# Patient Record
Sex: FEMALE | Race: WHITE | NOT HISPANIC OR LATINO | Employment: FULL TIME | URBAN - METROPOLITAN AREA
[De-identification: names, ages, dates, MRNs, and addresses within clinical notes are randomized per-mention and may not be internally consistent; named-entity substitution may affect disease eponyms.]

---

## 2020-05-27 ENCOUNTER — PATIENT MESSAGE (OUTPATIENT)
Dept: OBSTETRICS AND GYNECOLOGY | Facility: CLINIC | Age: 45
End: 2020-05-27

## 2020-12-22 RX ORDER — DESOGESTREL AND ETHINYL ESTRADIOL AND ETHINYL ESTRADIOL 21-5 (28)
KIT ORAL
Qty: 28 TABLET | Refills: 5 | Status: SHIPPED | OUTPATIENT
Start: 2020-12-22 | End: 2021-05-17 | Stop reason: SDUPTHER

## 2020-12-22 NOTE — TELEPHONE ENCOUNTER
Notified pt her medication has been sent to pharmacy. Pt verbalized understanding.  Appt. susan'maggie in April 2021

## 2021-05-20 RX ORDER — DESOGESTREL AND ETHINYL ESTRADIOL 21-5 (28)
1 KIT ORAL DAILY
Qty: 28 TABLET | Refills: 0 | Status: SHIPPED | OUTPATIENT
Start: 2021-05-20 | End: 2021-05-27 | Stop reason: SDUPTHER

## 2021-05-27 ENCOUNTER — OFFICE VISIT (OUTPATIENT)
Dept: OBSTETRICS AND GYNECOLOGY | Facility: CLINIC | Age: 46
End: 2021-05-27
Payer: COMMERCIAL

## 2021-05-27 VITALS — DIASTOLIC BLOOD PRESSURE: 82 MMHG | WEIGHT: 157 LBS | SYSTOLIC BLOOD PRESSURE: 135 MMHG

## 2021-05-27 DIAGNOSIS — Z30.41 ENCOUNTER FOR SURVEILLANCE OF CONTRACEPTIVE PILLS: ICD-10-CM

## 2021-05-27 DIAGNOSIS — Z01.419 WELL WOMAN EXAM WITH ROUTINE GYNECOLOGICAL EXAM: Primary | ICD-10-CM

## 2021-05-27 DIAGNOSIS — Z12.31 SCREENING MAMMOGRAM, ENCOUNTER FOR: ICD-10-CM

## 2021-05-27 PROCEDURE — 99396 PR PREVENTIVE VISIT,EST,40-64: ICD-10-PCS | Mod: S$GLB,,, | Performed by: OBSTETRICS & GYNECOLOGY

## 2021-05-27 PROCEDURE — 99396 PREV VISIT EST AGE 40-64: CPT | Mod: S$GLB,,, | Performed by: OBSTETRICS & GYNECOLOGY

## 2021-05-27 RX ORDER — TRAZODONE HYDROCHLORIDE 100 MG/1
TABLET ORAL
COMMUNITY
Start: 2021-04-17

## 2021-05-27 RX ORDER — PLECANATIDE 3 MG/1
1 TABLET ORAL DAILY
COMMUNITY
Start: 2021-04-24 | End: 2022-08-16 | Stop reason: SDUPTHER

## 2021-05-27 RX ORDER — DESOGESTREL AND ETHINYL ESTRADIOL 21-5 (28)
1 KIT ORAL DAILY
Qty: 84 TABLET | Refills: 4 | Status: SHIPPED | OUTPATIENT
Start: 2021-05-27 | End: 2021-06-24

## 2021-05-27 RX ORDER — LEVOTHYROXINE SODIUM 88 UG/1
88 TABLET ORAL DAILY
COMMUNITY
Start: 2021-05-15

## 2021-05-27 RX ORDER — BUPROPION HYDROCHLORIDE 300 MG/1
300 TABLET ORAL DAILY
COMMUNITY
Start: 2021-05-15

## 2021-12-21 ENCOUNTER — IMMUNIZATION (OUTPATIENT)
Dept: PRIMARY CARE CLINIC | Facility: CLINIC | Age: 46
End: 2021-12-21
Payer: COMMERCIAL

## 2021-12-21 DIAGNOSIS — Z23 NEED FOR VACCINATION: Primary | ICD-10-CM

## 2022-08-16 ENCOUNTER — OFFICE VISIT (OUTPATIENT)
Dept: GASTROENTEROLOGY | Facility: CLINIC | Age: 47
End: 2022-08-16
Payer: COMMERCIAL

## 2022-08-16 VITALS
OXYGEN SATURATION: 98 % | HEIGHT: 59 IN | WEIGHT: 160.38 LBS | DIASTOLIC BLOOD PRESSURE: 72 MMHG | BODY MASS INDEX: 32.33 KG/M2 | HEART RATE: 95 BPM | SYSTOLIC BLOOD PRESSURE: 111 MMHG

## 2022-08-16 DIAGNOSIS — K21.9 GASTROESOPHAGEAL REFLUX DISEASE, UNSPECIFIED WHETHER ESOPHAGITIS PRESENT: ICD-10-CM

## 2022-08-16 DIAGNOSIS — Z87.11 HISTORY OF GASTRIC ULCER: ICD-10-CM

## 2022-08-16 DIAGNOSIS — K59.00 CONSTIPATION, UNSPECIFIED CONSTIPATION TYPE: ICD-10-CM

## 2022-08-16 DIAGNOSIS — R14.0 BLOATING: ICD-10-CM

## 2022-08-16 DIAGNOSIS — R10.816 EPIGASTRIC ABDOMINAL TENDERNESS WITHOUT REBOUND TENDERNESS: ICD-10-CM

## 2022-08-16 DIAGNOSIS — K58.1 IRRITABLE BOWEL SYNDROME WITH CONSTIPATION: Primary | ICD-10-CM

## 2022-08-16 PROCEDURE — 99213 PR OFFICE/OUTPT VISIT, EST, LEVL III, 20-29 MIN: ICD-10-PCS | Mod: S$GLB,,, | Performed by: INTERNAL MEDICINE

## 2022-08-16 PROCEDURE — 3074F PR MOST RECENT SYSTOLIC BLOOD PRESSURE < 130 MM HG: ICD-10-PCS | Mod: CPTII,S$GLB,, | Performed by: INTERNAL MEDICINE

## 2022-08-16 PROCEDURE — 3078F DIAST BP <80 MM HG: CPT | Mod: CPTII,S$GLB,, | Performed by: INTERNAL MEDICINE

## 2022-08-16 PROCEDURE — 1160F PR REVIEW ALL MEDS BY PRESCRIBER/CLIN PHARMACIST DOCUMENTED: ICD-10-PCS | Mod: CPTII,S$GLB,, | Performed by: INTERNAL MEDICINE

## 2022-08-16 PROCEDURE — 3008F PR BODY MASS INDEX (BMI) DOCUMENTED: ICD-10-PCS | Mod: CPTII,S$GLB,, | Performed by: INTERNAL MEDICINE

## 2022-08-16 PROCEDURE — 3074F SYST BP LT 130 MM HG: CPT | Mod: CPTII,S$GLB,, | Performed by: INTERNAL MEDICINE

## 2022-08-16 PROCEDURE — 1160F RVW MEDS BY RX/DR IN RCRD: CPT | Mod: CPTII,S$GLB,, | Performed by: INTERNAL MEDICINE

## 2022-08-16 PROCEDURE — 3008F BODY MASS INDEX DOCD: CPT | Mod: CPTII,S$GLB,, | Performed by: INTERNAL MEDICINE

## 2022-08-16 PROCEDURE — 3078F PR MOST RECENT DIASTOLIC BLOOD PRESSURE < 80 MM HG: ICD-10-PCS | Mod: CPTII,S$GLB,, | Performed by: INTERNAL MEDICINE

## 2022-08-16 PROCEDURE — 1159F MED LIST DOCD IN RCRD: CPT | Mod: CPTII,S$GLB,, | Performed by: INTERNAL MEDICINE

## 2022-08-16 PROCEDURE — 1159F PR MEDICATION LIST DOCUMENTED IN MEDICAL RECORD: ICD-10-PCS | Mod: CPTII,S$GLB,, | Performed by: INTERNAL MEDICINE

## 2022-08-16 PROCEDURE — 99213 OFFICE O/P EST LOW 20 MIN: CPT | Mod: S$GLB,,, | Performed by: INTERNAL MEDICINE

## 2022-08-16 RX ORDER — PLECANATIDE 3 MG/1
1 TABLET ORAL DAILY
Qty: 90 TABLET | Refills: 3 | Status: SHIPPED | OUTPATIENT
Start: 2022-08-16 | End: 2022-08-18

## 2022-08-16 RX ORDER — PANTOPRAZOLE SODIUM 40 MG/1
40 TABLET, DELAYED RELEASE ORAL DAILY
COMMUNITY
End: 2022-08-16 | Stop reason: SDUPTHER

## 2022-08-16 RX ORDER — PANTOPRAZOLE SODIUM 40 MG/1
40 TABLET, DELAYED RELEASE ORAL DAILY
Qty: 90 TABLET | Refills: 3 | Status: SHIPPED | OUTPATIENT
Start: 2022-08-16 | End: 2023-08-16 | Stop reason: SDUPTHER

## 2022-08-16 NOTE — LETTER
August 16, 2022        Jimbo Phillips MD  [address]   [facility]  [place] [address]             [place] [facility]  [address] [address]  Phone: [phone]  Fax: [phone]   Patient: Lorena WALLACE   MR Number: [id number]   YOB: 1975   Date of Visit: 8/16/2022       Dear Dr. Phillips:    Thank you for referring Lorena WALLACE to me for evaluation. Attached you will find relevant portions of my assessment and plan of care.    If you have questions, please do not hesitate to call me. I look forward to following Lorena WALLACE along with you.    Sincerely,      Nesha Christopher MD            CC  No Recipients    Enclosure

## 2022-08-16 NOTE — PATIENT INSTRUCTIONS
Schedule upper endoscopy.   Continue Trulance daily for constipation.  Continue pantoprazole 40 mg daily for reflux.     Please notify my office if you have not been contacted within two weeks after any procedures, submitting any samples (biopsies, blood, stool, urine, etc.) or after any imaging (X-ray, CT, MRI, etc.).

## 2022-08-16 NOTE — PROGRESS NOTES
Clinic Note    Reason for visit:  The primary encounter diagnosis was Irritable bowel syndrome with constipation. Diagnoses of History of gastric ulcer, Gastroesophageal reflux disease, unspecified whether esophagitis present, Constipation, unspecified constipation type, Bloating, and Epigastric abdominal tenderness without rebound tenderness were also pertinent to this visit.    PCP: Jimbo Phillips       HPI:  This is a 47 y.o. female who is being seen for a follow up visit. Patient with IBS-C, was on Trulance but has been out of medication for about a year. Trulance did work for her. Patient with GERD, on panto 40 daily which works for reflux. Has been out of medication for some time so she is currently having reflux. She reports for past 2 months, she has had constipation alternating with diarrhea, bloating, gas, and foul smelling stool. She reports yesterday having pale-colored stool and today she had a dark stool. Occasional BRBPR that she attributes to hemorrhoids.     EGD/Colonoscopy 6/17/2020 with [facility]: single superficial ulcer in fundus at site of previous surgery. GBx wnl w/o Hp. Colon nl. Repeat colon in 10 yr    Old chart:    Irritable bowel syndrome with constipation: controlled with Trulance, Squatty Potty helped       Gastric ulcer: in fundus 2020, thought to be related to suture/staple   6/17/2020      Gastroesophageal Reflux Disease (GERD): controlled mostly with panto 40 daily, failed 20 daily after 3 days       Screening of colonic neoplasia: average risk, due 2030     Review of Systems   Constitutional: Negative for chills, diaphoresis, fatigue, fever and unexpected weight change.   HENT: Negative for mouth sores, nosebleeds, postnasal drip, sore throat, trouble swallowing and voice change.    Eyes: Negative for pain, discharge and eye dryness.   Respiratory: Positive for apnea, cough and choking. Negative for chest tightness, shortness of breath and wheezing.    Cardiovascular: Negative for chest  pain, palpitations, leg swelling and claudication.   Gastrointestinal: Positive for abdominal distention, abdominal pain, anal bleeding, constipation, diarrhea, nausea, vomiting and reflux. Negative for blood in stool, change in bowel habit, rectal pain, fecal incontinence and change in bowel habit.   Genitourinary: Negative for bladder incontinence, difficulty urinating, dysuria, flank pain, frequency and hematuria.   Musculoskeletal: Negative for arthralgias, back pain, joint swelling and joint deformity.   Integumentary:  Negative for color change, rash and wound.   Allergic/Immunologic: Positive for food allergies. Negative for environmental allergies.   Neurological: Negative for seizures, facial asymmetry, speech difficulty, weakness, headaches and memory loss.   Hematological: Negative for adenopathy. Does not bruise/bleed easily.   Psychiatric/Behavioral: Negative for agitation, behavioral problems, confusion, hallucinations and sleep disturbance.      Past Medical History:   Diagnosis Date    Endometriosis     Fatty liver     Hepatitis     Hiatal hernia with GERD     Hypothyroidism     Irritable bowel syndrome with constipation     Obesity, Class I, BMI 30.0-34.9 (see actual BMI)     PCOS (polycystic ovarian syndrome)     Precancerous skin lesion     nose    Sleep apnea     no CPAP since recall     Past Surgical History:   Procedure Laterality Date    COLONOSCOPY  03/2020    FULGURATION OF ENDOMETRIOSIS      GASTRIC SLEEVE  2017    On[place]     Family History   Problem Relation Age of Onset    Diabetes Mother     Hypertension Mother     Heart failure Mother     Kidney cancer Father     Diabetes Father     Sleep apnea Brother     Brain cancer Brother 29        noted as tumor    Colon cancer Paternal Grandmother 60     Social History     Tobacco Use    Smoking status: Former Smoker     Quit date: 2012     Years since quitting: 10.6    Smokeless tobacco: Never Used   Substance Use Topics  "   Alcohol use: Yes     Comment: social    Drug use: Never     Review of patient's allergies indicates:  No Known Allergies     Medication List with Changes/Refills   Current Medications    BUPROPION (WELLBUTRIN XL) 300 MG 24 HR TABLET    Take 300 mg by mouth once daily.    LEVOTHYROXINE (SYNTHROID) 88 MCG TABLET    Take 88 mcg by mouth once daily.    TRAZODONE (DESYREL) 100 MG TABLET    TAKE 1/2 TO 1 TABLET BY MOUTH AT BEDTIME AS NEEDED    VIORELE, 28, 0.15-0.02 MGX21 /0.01 MG X 5 PER TABLET    TAKE 1 TABLET BY MOUTH EVERY DAY   Changed and/or Refilled Medications    Modified Medication Previous Medication    PANTOPRAZOLE (PROTONIX) 40 MG TABLET pantoprazole (PROTONIX) 40 MG tablet       Take 1 tablet (40 mg total) by mouth once daily.    Take 40 mg by mouth once daily.    TRULANCE 3 MG TAB TRULANCE 3 mg Tab       Take 1 tablet by mouth once daily.    Take 1 tablet by mouth once daily.         Vital Signs:  /72   Pulse 95   Ht 4' 11" (1.499 m)   Wt 72.8 kg (160 lb 6.4 oz)   SpO2 98%   BMI 32.40 kg/m²         Physical Exam  Vitals reviewed.   Constitutional:       General: She is awake. She is not in acute distress.     Appearance: Normal appearance. She is well-developed. She is obese. She is not ill-appearing, toxic-appearing or diaphoretic.   HENT:      Head: Normocephalic and atraumatic.      Nose: Nose normal.      Mouth/Throat:      Mouth: Mucous membranes are moist.      Pharynx: Oropharynx is clear. No oropharyngeal exudate or posterior oropharyngeal erythema.   Eyes:      General: Lids are normal. Gaze aligned appropriately. No scleral icterus.        Right eye: No discharge.         Left eye: No discharge.      Extraocular Movements: Extraocular movements intact.      Conjunctiva/sclera: Conjunctivae normal.   Neck:      Trachea: Trachea normal.   Cardiovascular:      Rate and Rhythm: Normal rate and regular rhythm.      Pulses:           Radial pulses are 2+ on the right side and 2+ on the " left side.   Pulmonary:      Effort: Pulmonary effort is normal. No respiratory distress.      Breath sounds: Normal breath sounds. No stridor. No wheezing or rhonchi.   Chest:      Chest wall: No tenderness.   Abdominal:      General: Bowel sounds are normal. There is no distension.      Palpations: Abdomen is soft. There is no fluid wave, hepatomegaly or mass.      Tenderness: There is abdominal tenderness in the epigastric area. There is no guarding or rebound.      Comments: Mild TTP   Musculoskeletal:         General: No tenderness or deformity.      Cervical back: Full passive range of motion without pain and neck supple. No tenderness.      Right lower leg: No edema.      Left lower leg: No edema.   Lymphadenopathy:      Cervical: No cervical adenopathy.   Skin:     General: Skin is warm and dry.      Capillary Refill: Capillary refill takes less than 2 seconds.      Coloration: Skin is not cyanotic, jaundiced or pale.      Findings: No rash.   Neurological:      General: No focal deficit present.      Mental Status: She is alert and oriented to person, place, and time.      Cranial Nerves: No facial asymmetry.      Motor: No tremor.   Psychiatric:         Attention and Perception: Attention normal.         Mood and Affect: Mood and affect normal.         Speech: Speech normal.         Behavior: Behavior normal. Behavior is cooperative.            All of the data above and below has been reviewed by myself and any further interpretations will be reflected in the assessment and plan.   The data includes review of external notes, and independent interpretation of lab results, procedures, x-rays, and imaging reports.      Assessment:  Irritable bowel syndrome with constipation    History of gastric ulcer  -     Ambulatory Referral to External Surgery    Gastroesophageal reflux disease, unspecified whether esophagitis present  -     pantoprazole (PROTONIX) 40 MG tablet; Take 1 tablet (40 mg total) by mouth once  daily.  Dispense: 90 tablet; Refill: 3  -     Ambulatory Referral to External Surgery    Constipation, unspecified constipation type  -     TRULANCE 3 mg Tab; Take 1 tablet by mouth once daily.  Dispense: 90 tablet; Refill: 3    Bloating    Epigastric abdominal tenderness without rebound tenderness  -     Ambulatory Referral to External Surgery    Will start back on Trulance daily to see if regulates BMs again. If still having changes in bowel habits, will consider repeat colonoscopy.   Plan for EGD with G/D biopsies to rule out Celiac and H. Pylori and to ensure gastric ulcer has healed.  was thought to be related to prior gastric sleeve.     Recommendations:  Schedule upper endoscopy.   Continue Trulance daily for constipation.  Continue pantoprazole 40 mg daily for reflux.     Risks, benefits, and alternatives of medical management, any associated procedures, and/or treatment discussed with the patient. Patient given opportunity to ask questions and voices understanding. Patient has elected to proceed with the recommended care modalities as discussed.    Follow up in about 1 year (around 8/16/2023).    Order summary:  Orders Placed This Encounter   Procedures    Ambulatory Referral to External Surgery        Instructed patient to notify my office if they have not been contacted within two weeks after any procedures, submitting any samples (biopsies, blood, stool, urine, etc.) or after any imaging (X-ray, CT, MRI, etc.).     Nesha Christopher MD    This document may have been created using a voice recognition transcribing system. Incorrect words or phrases may have been missed during proofreading. Please interpret accordingly or contact me for clarification.

## 2022-09-16 ENCOUNTER — TELEPHONE (OUTPATIENT)
Dept: GASTROENTEROLOGY | Facility: CLINIC | Age: 47
End: 2022-09-16
Payer: COMMERCIAL

## 2022-09-16 NOTE — TELEPHONE ENCOUNTER
----- Message from Nicole Ordoñez sent at 9/13/2022  3:02 PM CDT -----  pt states that she needs call back regarding status of TRULANCE 3 mg autho, also needs samples..[phone] (home)

## 2022-09-16 NOTE — TELEPHONE ENCOUNTER
----- Message from Nicole Ordoñez sent at 9/13/2022  3:02 PM CDT -----  pt states that she needs call back regarding status of TRULANCE 3 mg autho, also needs samples..[phone] (home)

## 2022-09-16 NOTE — TELEPHONE ENCOUNTER
On 09/15/2022 Spoke with patient regarding trying to get pre-authorization for med from insurance. Also spoke with Malka Hernandez NP about giving samples. Samples left up front to be picked up. Notified patient and verbalized understanding.     09/16/2022 Spoke with Swapna at [facility] pharmacy regarding Trulance stated not covered by patients insurance and not giving option of PA. I will call insurance today to see what can be done.    Called [employer] insurance and need to call patients prescription insurance for med authorization. Do not have that info in chart. Called patient to get information with no answer. Left message for patient to return my call with information of medication insurance information so I can follow-up on getting PA for trulance.

## 2022-09-19 NOTE — TELEPHONE ENCOUNTER
Spoke with patient this a.m. got proper information for prescription insurance. Called insurance company and prior authorization had already been sent per IVETTE Richards. PA under review will notify patient and MD when decision is made.

## 2022-10-10 ENCOUNTER — OUTSIDE PLACE OF SERVICE (OUTPATIENT)
Dept: GASTROENTEROLOGY | Facility: CLINIC | Age: 47
End: 2022-10-10

## 2022-10-10 PROCEDURE — 43239 PR EGD, FLEX, W/BIOPSY, SGL/MULTI: ICD-10-PCS | Mod: ,,, | Performed by: INTERNAL MEDICINE

## 2022-10-10 PROCEDURE — 43239 EGD BIOPSY SINGLE/MULTIPLE: CPT | Mod: ,,, | Performed by: INTERNAL MEDICINE

## 2022-10-16 ENCOUNTER — TELEPHONE (OUTPATIENT)
Dept: GASTROENTEROLOGY | Facility: CLINIC | Age: 47
End: 2022-10-16
Payer: COMMERCIAL

## 2022-10-17 NOTE — TELEPHONE ENCOUNTER
DBx nl, GBx react w/o Hp.   Notify patient no signs of Celiac disease or Hp on her small bowel and stomach Bx. No gastric ulcer. Her gastric sleeve surgery site looks well healed. Notify me if any issues. She should be back on her Trulance daily and panto 40 daily.  NBP

## 2023-02-26 NOTE — PROGRESS NOTES
Lorena WALLACE is a 47 y.o. female  who presents for a well woman exam.  She has had a rash at the crease of her abdomen for 2 weeks.     Past Medical History:   Diagnosis Date    Endometriosis     Fatty liver     Hepatitis     Hiatal hernia with GERD     Hypothyroidism     Irritable bowel syndrome with constipation     Obesity, Class I, BMI 30.0-34.9 (see actual BMI)     PCOS (polycystic ovarian syndrome)     Precancerous skin lesion     nose    Sleep apnea     no CPAP since recall       Past Surgical History:   Procedure Laterality Date    COLONOSCOPY  2020    FULGURATION OF ENDOMETRIOSIS      GASTRIC SLEEVE      [place]       OB History    Para Term  AB Living   1 0 0 0 1 0   SAB IAB Ectopic Multiple Live Births   1 0 0 0 0      # Outcome Date GA Lbr Herberth/2nd Weight Sex Delivery Anes PTL Lv   1 SAB                Family History   Problem Relation Age of Onset    Colon cancer Paternal Grandmother 60    Kidney cancer Father     Diabetes Father     Diabetes Mother     Hypertension Mother     Heart failure Mother     Sleep apnea Brother     Brain cancer Brother 29        noted as tumor    Cervical cancer Maternal Aunt        Social History     Tobacco Use    Smoking status: Former     Packs/day: 1.00     Years: 20.00     Pack years: 20.00     Types: Cigarettes     Quit date:      Years since quittin.1     Passive exposure: Past    Smokeless tobacco: Never   Substance Use Topics    Alcohol use: Not Currently     Comment: social    Drug use: Never         Current Outpatient Medications:     amitriptyline (ELAVIL) 10 MG tablet, TAKE 1 TABLET BY MOUTH 1 HOUR BEFORE BEDTIME, Disp: , Rfl:     buPROPion (WELLBUTRIN XL) 300 MG 24 hr tablet, Take 300 mg by mouth once daily., Disp: , Rfl:     levothyroxine (SYNTHROID) 88 MCG tablet, Take 88 mcg by mouth once daily., Disp: , Rfl:     pantoprazole (PROTONIX) 40 MG tablet, Take 1 tablet (40 mg total) by mouth once daily., Disp: 90 tablet, Rfl: 3     "traZODone (DESYREL) 100 MG tablet, TAKE 1/2 TO 1 TABLET BY MOUTH AT BEDTIME AS NEEDED, Disp: , Rfl:     VIORELE, 28, 0.15-0.02 mgx21 /0.01 mg x 5 per tablet, TAKE 1 TABLET BY MOUTH EVERY DAY, Disp: 28 tablet, Rfl: 7    clotrimazole-betamethasone 1-0.05% (LOTRISONE) cream, Apply to affected area 2 times daily, Disp: 15 g, Rfl: 1    fluconazole (DIFLUCAN) 100 MG tablet, Take 1 tablet (100 mg total) by mouth once daily. for 3 days, Disp: 3 tablet, Rfl: 0    TRULANCE 3 mg Tab, TAKE 1 TABLET BY MOUTH EVERY DAY (Patient not taking: Reported on 2/27/2023), Disp: 90 tablet, Rfl: 3     Review of patient's allergies indicates:  No Known Allergies     ROS:  GENERAL: Denies weight gain or weight loss. Feeling well overall.   SKIN: Denies rash or lesions.   HEAD: Denies head injury or headache.   NODES: Denies enlarged lymph nodes.   CHEST: Denies shortness of breath.   CARDIOVASCULAR: Denies palpitations or chest pain.   ABDOMEN: Denies abdominal pain, constipation, diarrhea, nausea, vomiting or rectal bleeding.   URINARY: Denies frequency, dysuria, hematuria, or burning on urination.  REPRODUCTIVE: See HPI.   BREASTS: Denies pain, lumps, or nipple discharge.   HEMATOLOGIC: Denies easy bruisability or excessive bleeding.  MUSCULOSKELETAL: Denies joint pain or swelling.   NEUROLOGIC: Denies syncope or weakness.   PSYCHIATRIC: Denies depression, anxiety or mood swings.    PHYSICAL EXAM:    /80 (BP Location: Left arm, Patient Position: Sitting, BP Method: Large (Manual))   Pulse 80   Ht 4' 11" (1.499 m)   Wt 75.2 kg (165 lb 12.8 oz)   LMP 02/01/2023 (Approximate) Comment: only bled for 1 day  BMI 33.49 kg/m²    Body mass index is 33.49 kg/m².     APPEARANCE: Well nourished, well developed, in no acute distress.  AFFECT: WNL, alert and oriented x 3  SKIN: No acne or hirsutism  NECK: Neck symmetric without masses or thyromegaly  NODES: No inguinal, cervical, axillary, or femoral lymph node enlargement  CHEST: Good " respiratory effect  ABDOMEN: Soft.  No tenderness or masses.  No hepatosplenomegaly.  No hernias. Erythematous papular rash at the crease.   BREASTS: Symmetrical, no skin changes or visible lesions.  No palpable masses, nipple discharge bilaterally.  PELVIC: Normal external genitalia without lesions.  Normal hair distribution.  Adequate perineal body, normal urethral meatus.  Urethra and bladder without tenderness or masses. Vagina moist and well rugated without lesions or discharge.  Cervix pink, without lesions, discharge or tenderness.  No significant cystocele or rectocele.  Bimanual exam shows uterus to be normal size, regular, mobile and nontender.  Adnexa without masses or tenderness.    EXTREMITIES: No edema.     Well woman exam with routine gynecological exam  -     Liquid-based pap smear, screening    Screening mammogram, encounter for  -     Mammo Digital Screening Bilat w/ [facility]; Future    Yeast dermatitis  -     clotrimazole-betamethasone 1-0.05% (LOTRISONE) cream; Apply to affected area 2 times daily  Dispense: 15 g; Refill: 1  -     fluconazole (DIFLUCAN) 100 MG tablet; Take 1 tablet (100 mg total) by mouth once daily. for 3 days  Dispense: 3 tablet; Refill: 0         Patient was counseled today on A.C.S. Pap guidelines and recommendations for yearly pelvic exams, mammograms and monthly self breast exams; to see her PCP for other health maintenance.     Follow up in 1 year

## 2023-02-27 ENCOUNTER — OFFICE VISIT (OUTPATIENT)
Dept: OBSTETRICS AND GYNECOLOGY | Facility: CLINIC | Age: 48
End: 2023-02-27
Payer: COMMERCIAL

## 2023-02-27 VITALS
HEIGHT: 59 IN | BODY MASS INDEX: 33.43 KG/M2 | HEART RATE: 80 BPM | DIASTOLIC BLOOD PRESSURE: 80 MMHG | WEIGHT: 165.81 LBS | SYSTOLIC BLOOD PRESSURE: 124 MMHG

## 2023-02-27 DIAGNOSIS — B37.2 YEAST DERMATITIS: ICD-10-CM

## 2023-02-27 DIAGNOSIS — Z12.31 SCREENING MAMMOGRAM, ENCOUNTER FOR: ICD-10-CM

## 2023-02-27 DIAGNOSIS — Z01.419 WELL WOMAN EXAM WITH ROUTINE GYNECOLOGICAL EXAM: Primary | ICD-10-CM

## 2023-02-27 PROCEDURE — 99396 PR PREVENTIVE VISIT,EST,40-64: ICD-10-PCS | Mod: S$GLB,,, | Performed by: OBSTETRICS & GYNECOLOGY

## 2023-02-27 PROCEDURE — 3079F DIAST BP 80-89 MM HG: CPT | Mod: CPTII,S$GLB,, | Performed by: OBSTETRICS & GYNECOLOGY

## 2023-02-27 PROCEDURE — 3074F PR MOST RECENT SYSTOLIC BLOOD PRESSURE < 130 MM HG: ICD-10-PCS | Mod: CPTII,S$GLB,, | Performed by: OBSTETRICS & GYNECOLOGY

## 2023-02-27 PROCEDURE — 3074F SYST BP LT 130 MM HG: CPT | Mod: CPTII,S$GLB,, | Performed by: OBSTETRICS & GYNECOLOGY

## 2023-02-27 PROCEDURE — 1159F MED LIST DOCD IN RCRD: CPT | Mod: CPTII,S$GLB,, | Performed by: OBSTETRICS & GYNECOLOGY

## 2023-02-27 PROCEDURE — 3079F PR MOST RECENT DIASTOLIC BLOOD PRESSURE 80-89 MM HG: ICD-10-PCS | Mod: CPTII,S$GLB,, | Performed by: OBSTETRICS & GYNECOLOGY

## 2023-02-27 PROCEDURE — 3008F PR BODY MASS INDEX (BMI) DOCUMENTED: ICD-10-PCS | Mod: CPTII,S$GLB,, | Performed by: OBSTETRICS & GYNECOLOGY

## 2023-02-27 PROCEDURE — 1159F PR MEDICATION LIST DOCUMENTED IN MEDICAL RECORD: ICD-10-PCS | Mod: CPTII,S$GLB,, | Performed by: OBSTETRICS & GYNECOLOGY

## 2023-02-27 PROCEDURE — 99396 PREV VISIT EST AGE 40-64: CPT | Mod: S$GLB,,, | Performed by: OBSTETRICS & GYNECOLOGY

## 2023-02-27 PROCEDURE — 3008F BODY MASS INDEX DOCD: CPT | Mod: CPTII,S$GLB,, | Performed by: OBSTETRICS & GYNECOLOGY

## 2023-02-27 RX ORDER — AMITRIPTYLINE HYDROCHLORIDE 10 MG/1
TABLET, FILM COATED ORAL
COMMUNITY
Start: 2023-01-30

## 2023-02-27 RX ORDER — CLOTRIMAZOLE AND BETAMETHASONE DIPROPIONATE 10; .64 MG/G; MG/G
CREAM TOPICAL
Qty: 15 G | Refills: 1 | Status: SHIPPED | OUTPATIENT
Start: 2023-02-27 | End: 2023-08-16

## 2023-02-27 RX ORDER — FLUCONAZOLE 100 MG/1
100 TABLET ORAL DAILY
Qty: 3 TABLET | Refills: 0 | Status: SHIPPED | OUTPATIENT
Start: 2023-02-27 | End: 2023-03-02

## 2023-03-02 LAB — Lab: NORMAL

## 2023-03-09 DIAGNOSIS — Z30.41 ENCOUNTER FOR SURVEILLANCE OF CONTRACEPTIVE PILLS: ICD-10-CM

## 2023-03-09 RX ORDER — DESOGESTREL AND ETHINYL ESTRADIOL AND ETHINYL ESTRADIOL 21-5 (28)
KIT ORAL
Qty: 84 TABLET | Refills: 4 | Status: SHIPPED | OUTPATIENT
Start: 2023-03-09 | End: 2023-08-16

## 2023-03-09 NOTE — TELEPHONE ENCOUNTER
Please notify pt we received a refill request from [facility] for [facility]. Does she need this refilled?

## 2023-05-30 ENCOUNTER — TELEPHONE (OUTPATIENT)
Dept: OBSTETRICS AND GYNECOLOGY | Facility: CLINIC | Age: 48
End: 2023-05-30
Payer: COMMERCIAL

## 2023-05-30 NOTE — TELEPHONE ENCOUNTER
----- Message from Jonna Jones sent at 5/30/2023  1:27 PM CDT -----  Contact: self  Type: Staff Message    Caller: Lorena WALLACE    Call Back Number: [phone] or [phone]    Nature of the Call: Patient is wanting to speak with nurse and will only talk to nurse concerning nature of this call. Please call back asap    Additional Information: n/a

## 2023-05-30 NOTE — TELEPHONE ENCOUNTER
Returned call to patient. Patient is requesting a refill of Diflucan. Advised patient to call her PCP as Dr. Levi is out of the office until Friday. Patient verbalized understanding.

## 2023-08-16 ENCOUNTER — OFFICE VISIT (OUTPATIENT)
Dept: GASTROENTEROLOGY | Facility: CLINIC | Age: 48
End: 2023-08-16
Payer: COMMERCIAL

## 2023-08-16 VITALS
DIASTOLIC BLOOD PRESSURE: 77 MMHG | BODY MASS INDEX: 34.2 KG/M2 | OXYGEN SATURATION: 98 % | WEIGHT: 169.63 LBS | SYSTOLIC BLOOD PRESSURE: 119 MMHG | HEART RATE: 98 BPM | HEIGHT: 59 IN

## 2023-08-16 DIAGNOSIS — R14.0 BLOATING: ICD-10-CM

## 2023-08-16 DIAGNOSIS — K58.1 IRRITABLE BOWEL SYNDROME WITH CONSTIPATION: Primary | ICD-10-CM

## 2023-08-16 DIAGNOSIS — Z87.11 HISTORY OF PEPTIC ULCER DISEASE: ICD-10-CM

## 2023-08-16 DIAGNOSIS — K21.9 GASTROESOPHAGEAL REFLUX DISEASE, UNSPECIFIED WHETHER ESOPHAGITIS PRESENT: ICD-10-CM

## 2023-08-16 PROCEDURE — 3008F BODY MASS INDEX DOCD: CPT | Mod: CPTII,S$GLB,, | Performed by: INTERNAL MEDICINE

## 2023-08-16 PROCEDURE — 99213 PR OFFICE/OUTPT VISIT, EST, LEVL III, 20-29 MIN: ICD-10-PCS | Mod: S$GLB,,, | Performed by: INTERNAL MEDICINE

## 2023-08-16 PROCEDURE — 1160F RVW MEDS BY RX/DR IN RCRD: CPT | Mod: CPTII,S$GLB,, | Performed by: INTERNAL MEDICINE

## 2023-08-16 PROCEDURE — 1160F PR REVIEW ALL MEDS BY PRESCRIBER/CLIN PHARMACIST DOCUMENTED: ICD-10-PCS | Mod: CPTII,S$GLB,, | Performed by: INTERNAL MEDICINE

## 2023-08-16 PROCEDURE — 99213 OFFICE O/P EST LOW 20 MIN: CPT | Mod: S$GLB,,, | Performed by: INTERNAL MEDICINE

## 2023-08-16 PROCEDURE — 3008F PR BODY MASS INDEX (BMI) DOCUMENTED: ICD-10-PCS | Mod: CPTII,S$GLB,, | Performed by: INTERNAL MEDICINE

## 2023-08-16 PROCEDURE — 1159F PR MEDICATION LIST DOCUMENTED IN MEDICAL RECORD: ICD-10-PCS | Mod: CPTII,S$GLB,, | Performed by: INTERNAL MEDICINE

## 2023-08-16 PROCEDURE — 3074F PR MOST RECENT SYSTOLIC BLOOD PRESSURE < 130 MM HG: ICD-10-PCS | Mod: CPTII,S$GLB,, | Performed by: INTERNAL MEDICINE

## 2023-08-16 PROCEDURE — 3078F DIAST BP <80 MM HG: CPT | Mod: CPTII,S$GLB,, | Performed by: INTERNAL MEDICINE

## 2023-08-16 PROCEDURE — 1159F MED LIST DOCD IN RCRD: CPT | Mod: CPTII,S$GLB,, | Performed by: INTERNAL MEDICINE

## 2023-08-16 PROCEDURE — 3074F SYST BP LT 130 MM HG: CPT | Mod: CPTII,S$GLB,, | Performed by: INTERNAL MEDICINE

## 2023-08-16 PROCEDURE — 3078F PR MOST RECENT DIASTOLIC BLOOD PRESSURE < 80 MM HG: ICD-10-PCS | Mod: CPTII,S$GLB,, | Performed by: INTERNAL MEDICINE

## 2023-08-16 RX ORDER — PLECANATIDE 3 MG/1
3 TABLET ORAL
COMMUNITY

## 2023-08-16 RX ORDER — PANTOPRAZOLE SODIUM 40 MG/1
40 TABLET, DELAYED RELEASE ORAL DAILY
Qty: 90 TABLET | Refills: 3 | Status: SHIPPED | OUTPATIENT
Start: 2023-08-16

## 2023-08-16 RX ORDER — FLUTICASONE PROPIONATE 50 MCG
2 SPRAY, SUSPENSION (ML) NASAL 2 TIMES DAILY
COMMUNITY
Start: 2023-06-21

## 2023-08-16 NOTE — PROGRESS NOTES
Clinic Note    Reason for visit:  The primary encounter diagnosis was Irritable bowel syndrome with constipation. Diagnoses of Gastroesophageal reflux disease, unspecified whether esophagitis present, Bloating, and History of peptic ulcer disease were also pertinent to this visit.    PCP: Jimbo Phillips.       HPI:  This is a 48 y.o. female who is here for a follow up. Patient with IBS-C, on Trulance 3 mg every 2-3 days and having BM daily. She was having episodes of fecal incontinence when she was taking daily. She uses squatty potty. She takes pantoprazole 40 mg daily for reflux which controls her reflux well. If she misses 1 day she will have severe reflux. Denies dysphagia, abdominal pain.     EGD 10/10/2022: Evidence of a previous gastric sleeve was seen in the stomach. Appears healthy and well healed with some proximal gastric lumen dilation. DBx nl, GBx react w/o Hp    EGD/Colonoscopy 6/17/2020 with [facility]: single superficial ulcer in fundus at site of previous surgery. GBx wnl w/o Hp. Colon nl. Repeat colon in 10 yr    Review of Systems   Constitutional:  Negative for fatigue, fever and unexpected weight change.   HENT:  Positive for postnasal drip. Negative for mouth sores, sore throat and trouble swallowing.    Eyes:  Positive for eye dryness. Negative for pain and discharge.   Respiratory:  Negative for apnea, cough, choking, chest tightness, shortness of breath and wheezing.    Cardiovascular:  Negative for chest pain, palpitations and leg swelling.   Gastrointestinal:  Negative for abdominal distention, abdominal pain, anal bleeding, blood in stool, change in bowel habit, constipation, diarrhea, nausea, rectal pain, vomiting, reflux, fecal incontinence and change in bowel habit.   Genitourinary:  Negative for bladder incontinence, dysuria and hematuria.   Musculoskeletal:  Negative for arthralgias, back pain and joint swelling.   Integumentary:  Negative for color change and rash.   Allergic/Immunologic:  Positive for environmental allergies and food allergies.   Neurological:  Negative for seizures and headaches.   Hematological:  Negative for adenopathy. Does not bruise/bleed easily.        Past Medical History:   Diagnosis Date    Endometriosis     Fatty liver     Hiatal hernia with GERD     Hypothyroidism     Irritable bowel syndrome with constipation     Obesity, Class I, BMI 30.0-34.9 (see actual BMI)     PCOS (polycystic ovarian syndrome)     Precancerous skin lesion     nose    Sleep apnea     no CPAP since recall     Past Surgical History:   Procedure Laterality Date    COLONOSCOPY  2020    FULGURATION OF ENDOMETRIOSIS      GASTRIC SLEEVE      Onel     Family History   Problem Relation Age of Onset    Colon cancer Paternal Grandmother 60    Kidney cancer Father     Diabetes Father     Diabetes Mother     Hypertension Mother     Heart failure Mother     Sleep apnea Brother     Brain cancer Brother 29        noted as tumor    Cervical cancer Maternal Aunt      Social History     Tobacco Use    Smoking status: Former     Current packs/day: 0.00     Average packs/day: 1 pack/day for 20.0 years (20.0 ttl pk-yrs)     Types: Cigarettes     Start date:      Quit date:      Years since quittin.6     Passive exposure: Past    Smokeless tobacco: Never   Substance Use Topics    Alcohol use: Not Currently     Comment: social    Drug use: Never     Review of patient's allergies indicates:  No Known Allergies     Medication List with Changes/Refills   Current Medications    AMITRIPTYLINE (ELAVIL) 10 MG TABLET    TAKE 1 TABLET BY MOUTH 1 HOUR BEFORE BEDTIME    BUPROPION (WELLBUTRIN XL) 300 MG 24 HR TABLET    Take 300 mg by mouth once daily.    FLUTICASONE PROPIONATE (FLONASE) 50 MCG/ACTUATION NASAL SPRAY    2 sprays by Each Nostril route 2 (two) times daily.    LEVOTHYROXINE (SYNTHROID) 88 MCG TABLET    Take 88 mcg by mouth once daily.    PLECANATIDE (TRULANCE) 3 MG TAB    Take 3 mg by mouth.     "TRAZODONE (DESYREL) 100 MG TABLET    TAKE 1/2 TO 1 TABLET BY MOUTH AT BEDTIME AS NEEDED   Changed and/or Refilled Medications    Modified Medication Previous Medication    PANTOPRAZOLE (PROTONIX) 40 MG TABLET pantoprazole (PROTONIX) 40 MG tablet       Take 1 tablet (40 mg total) by mouth once daily.    Take 1 tablet (40 mg total) by mouth once daily.   Discontinued Medications    CLOTRIMAZOLE-BETAMETHASONE 1-0.05% (LOTRISONE) CREAM    Apply to affected area 2 times daily    DESOG-E.ESTRADIOL/E.ESTRADIOL (VIORELE, 28,) 0.15-0.02 MGX21 /0.01 MG X 5 PER TABLET    TAKE 1 TABLET BY MOUTH EVERY DAY         Vital Signs:  /77   Pulse 98   Ht 4' 11" (1.499 m)   Wt 76.9 kg (169 lb 9.6 oz)   SpO2 98%   BMI 34.26 kg/m²         Physical Exam  Vitals reviewed.   Constitutional:       General: She is awake. She is not in acute distress.     Appearance: Normal appearance. She is well-developed. She is not ill-appearing, toxic-appearing or diaphoretic.   HENT:      Head: Normocephalic and atraumatic.      Nose: Nose normal.      Mouth/Throat:      Mouth: Mucous membranes are moist.      Pharynx: Oropharynx is clear. No oropharyngeal exudate or posterior oropharyngeal erythema.   Eyes:      General: Lids are normal. Gaze aligned appropriately. No scleral icterus.        Right eye: No discharge.         Left eye: No discharge.      Conjunctiva/sclera: Conjunctivae normal.   Neck:      Trachea: Trachea normal.   Cardiovascular:      Rate and Rhythm: Normal rate and regular rhythm.      Pulses:           Radial pulses are 2+ on the right side and 2+ on the left side.   Pulmonary:      Effort: Pulmonary effort is normal. No respiratory distress.      Breath sounds: No stridor. No wheezing.   Chest:      Chest wall: No tenderness.   Abdominal:      General: Bowel sounds are normal. There is no distension.      Palpations: Abdomen is soft. There is no fluid wave, hepatomegaly or mass.      Tenderness: There is no abdominal " tenderness. There is no guarding or rebound.   Musculoskeletal:         General: No tenderness or deformity.      Cervical back: Full passive range of motion without pain and neck supple. No tenderness.      Right lower leg: No edema.      Left lower leg: No edema.   Lymphadenopathy:      Cervical: No cervical adenopathy.   Skin:     General: Skin is warm and dry.      Capillary Refill: Capillary refill takes less than 2 seconds.      Coloration: Skin is not cyanotic, jaundiced or pale.   Neurological:      General: No focal deficit present.      Mental Status: She is alert and oriented to person, place, and time.      Motor: No tremor.   Psychiatric:         Attention and Perception: Attention normal.         Mood and Affect: Mood and affect normal.         Speech: Speech normal.         Behavior: Behavior normal. Behavior is cooperative.            All of the data above and below has been reviewed by myself and any further interpretations will be reflected in the assessment and plan.   The data includes review of external notes, and independent interpretation of lab results, procedures, x-rays, and imaging reports.      Assessment:  Irritable bowel syndrome with constipation    Gastroesophageal reflux disease, unspecified whether esophagitis present  -     pantoprazole (PROTONIX) 40 MG tablet; Take 1 tablet (40 mg total) by mouth once daily.  Dispense: 90 tablet; Refill: 3    Bloating    History of peptic ulcer disease    GERD- on panto 40mg daily. Symptomatic with decreased dosing  Constipation- with squatty potty daily and trulance as needed.     Recommendations:    Continue with pantoprazole 40 mg daily.  Continue with Trulance as needed.      Risks, benefits, and alternatives of medical management, any associated procedures, and/or treatment discussed with the patient. Patient given opportunity to ask questions and voices understanding. Patient has elected to proceed with the recommended care modalities as  discussed.    Instructed patient to notify my office if they have not been contacted within two weeks after any procedures, submitting any samples (biopsies, blood, stool, urine, etc.) or after any imaging (X-ray, CT, MRI, etc.).     Follow up in about 2 years (around 8/16/2025).    Order summary:  No orders of the defined types were placed in this encounter.     This assessment, plan, and documentation was performed in collaboration with Marcy Dumont NP.      This document may have been created using a voice recognition transcribing system. Incorrect words or phrases may have been missed during proofreading. Please interpret accordingly or contact me for clarification.     Nesha Christopher MD

## 2023-08-16 NOTE — PATIENT INSTRUCTIONS
Continue with pantoprazole 40mg daily.  Continue with Trulance as needed.    Please notify my office if you have not been contacted within two weeks after any procedures, submitting any samples (biopsies, blood, stool, urine, etc.) or after any imaging (X-ray, CT, MRI, etc.).

## 2023-09-12 ENCOUNTER — TELEPHONE (OUTPATIENT)
Dept: GASTROENTEROLOGY | Facility: CLINIC | Age: 48
End: 2023-09-12
Payer: COMMERCIAL

## 2023-09-12 DIAGNOSIS — R19.7 ACUTE DIARRHEA: Primary | ICD-10-CM

## 2023-09-12 NOTE — TELEPHONE ENCOUNTER
The patient reports diarrhea for the past 9 days.  Occasional vomiting.  She has not been taking her Trulance.  She had high fever previously but that seems to have resolved.  She went to urgent care twice.  Got fluids once due to decreased urination.  No stool test so far.  We will order.  She requests the orders are sent to [place] [facility] across from [facility].  JAQUELINE

## 2024-02-27 RX ORDER — ONDANSETRON 8 MG/1
TABLET, ORALLY DISINTEGRATING ORAL
COMMUNITY
Start: 2023-09-07

## 2024-02-27 RX ORDER — DICYCLOMINE HYDROCHLORIDE 10 MG/1
CAPSULE ORAL
COMMUNITY
Start: 2023-09-07

## 2024-02-28 NOTE — PROGRESS NOTES
CC: WELL WOMAN (age 45-59)-perimenopausal  Patient Care Team:  Jimbo Phillips MD as PCP - General (Internal Medicine)  Oswaldo Asencio MD (Gastroenterology)    HPI:  Patient is a 48 y.o. who presents for her well woman exam today.  History reviewed with patient.   Patient also has additional concerns she wants to discuss at this visit (see additional problem note below)    NEW PATIENT       CONTRACEPTION: ocps  HX ABNL PAPS: normal after cryo- age 18    REVIEW OF PRIOR DATA/ HEALTH MAINTENANCE:  LAST ANNUAL:   2023   LAST MMG-    LAST LABS- does with PCP   LAST COLONOSCOPY- - by Dr. Christopher - q 10 yrs (neg)     Past Medical History:   Diagnosis Date    Endometriosis     Fatty liver     Hiatal hernia with GERD     Hypothyroidism     Irritable bowel syndrome with constipation     Obesity, Class I, BMI 30.0-34.9 (see actual BMI)     PCOS (polycystic ovarian syndrome)     Precancerous skin lesion     nose    Sleep apnea     no CPAP since recall     SURGICAL HX:   has a past surgical history that includes GASTRIC SLEEVE (); Fulguration of endometriosis; and Colonoscopy (2020).    SOCIAL HX:    reports that she quit smoking about 12 years ago. Her smoking use included cigarettes. She started smoking about 32 years ago. She has a 20.0 pack-year smoking history. She has been exposed to tobacco smoke. She has never used smokeless tobacco. She reports that she does not currently use alcohol. She reports that she does not use drugs.    FAMILY HX:   family history includes Brain cancer (age of onset: 29) in her brother; Cervical cancer in her maternal aunt; Colon cancer (age of onset: 60) in her paternal grandmother; Diabetes in her father and mother; Heart failure in her mother; Hypertension in her mother; Kidney cancer in her father; Sleep apnea in her brother. .    ALLERGIES:  Patient has no known allergies.    Current Outpatient Medications on File Prior to Visit   Medication Sig  Dispense Refill Last Dose    amitriptyline (ELAVIL) 10 MG tablet TAKE 1 TABLET BY MOUTH 1 HOUR BEFORE BEDTIME   Taking    buPROPion (WELLBUTRIN XL) 300 MG 24 hr tablet Take 300 mg by mouth once daily.   Taking    fluticasone propionate (FLONASE) 50 mcg/actuation nasal spray 2 sprays by Each Nostril route 2 (two) times daily.   Taking    levothyroxine (SYNTHROID) 88 MCG tablet Take 88 mcg by mouth once daily.   Taking    pantoprazole (PROTONIX) 40 MG tablet Take 1 tablet (40 mg total) by mouth once daily. 90 tablet 3 Taking    simvastatin (ZOCOR) 10 MG tablet TAKE 1 TABLET BY MOUTH EVERY NIGHT TO CONTROL CHOLESTEROL   Taking    traZODone (DESYREL) 100 MG tablet TAKE 1/2 TO 1 TABLET BY MOUTH AT BEDTIME AS NEEDED   Taking    dicyclomine (BENTYL) 10 MG capsule TAKE 1 CAPSULE BY MOUTH THREE TIMES A DAY AS NEEDED FOR PAIN X5 DAYS   Not Taking    ondansetron (ZOFRAN-ODT) 8 MG TbDL TAKE 1 TABLET BY MOUTH EVERY 8 HOURS AS NEEDED FOR NAUSEA FOR 5 DAYS   Not Taking    plecanatide (TRULANCE) 3 mg Tab Take 3 mg by mouth.   Not Taking     ROS:  CONST:  No fever, chills, fatigue or unexpected changes in weight   CV: No chest pain or palpitations   RESP:  No shortness of breath or cough   GI: No abd pain, vomiting, diarrhea, blood in stool, or changes in bowel mvmts   SKIN: No rashes or lesions  MUSCULOSKELETAL: No joint swelling or pain   PSYCH: No changes in mood or insomia   BREASTS: No asymmetry, lumps, pain, nipple discharge, or skin changes   :  No dysuria, urgency, frequency, hematuria or incontinence           No vag dc, itching, odor or dryness           No pelvic pain, dyspareunia, or abnormal vaginal bleeding     VITALS:  Blood pressure 90/66, weight 71.8 kg (158 lb 3.2 oz), last menstrual period 02/24/2024.  Body mass index is 31.95 kg/m².     PHYSICAL EXAM-  APPEARANCE: Well appearing, in no acute distress.   NECK: Neck symmetric.   CV:  Normal rate   PULM: Normal resp rate, no resp distress, normal resp effort   "  PSYCH: Normal mood and affect, cooperative   SKIN: No rashes, lesions, or abnormal bruising   LYMPH: No inguinal or axillary adenopathy   ABD: Soft, without tenderness or masses.   BREAST: Symmetrical, no nipple changes, no skin changes, No palpable masses   PELVIC:  VULVA: No lesions. Normal female genitalia.  URETHRAL MEATUS: No masses, no significant prolapse.   BLADDER/ URETHRA: No masses or suprapubic tenderness   VAGINA: No lesions or erythema, No discharge.   CVX: No lesions,no cervical motion tenderness.   UTERUS: Normal size/shape, mobile, non-tender   ADNEXA: No masses, tenderness, or fullness.   ANUS/ PERINEUM: Normal tone.  No lesions.     *female chaperone present for entire exam    ASSESSMENT and PLAN:  Breast cancer screening by mammogram  -     Mammo Digital Screening Bilat w/ Bentley; Future; Expected date: 03/12/2024    Encounter for well woman exam with routine gynecological exam  -     Liquid-based pap smear, screenin     FOLLOWUP:  1 year for wwe or sooner prn    COUNSELING:  Patient was counseled today on recommendation for yearly pelvic exam, current Pap guidelines, self breast exams, contraception, recommendations for annual screening mammograms, and routine screening colonoscopy at age 44.  Encouraged patient to see her PCP for other health maintenance.    PROBLEM VISIT:  Problem HPI/ROS:            Pt Has been off ocps for about a year and cycles still regular but sometimes vary in length b/w 2-4 days and not heavy. In last 10 months, patient has noticed several things happen in the 3-4 days prior to her cycle that are recurrent and never happen any other time. Gets several areas on her skin where she gets itching with no rash (umbilicus, vulva) and then gets red and itchy under her ring those few days. Also has the regular pms and difficulty sleep for a couple nights that she always has had but the "inflammation" type issues are new. Also went to ER last week after waking up from sleep with " cp sob and went to er. Had workup for afib and had imaging and nothing found and they told it must be a panic attack. Also had just her right arm/hand shaking but that resolved with the iv valium and none of these symptoms have returned since. Pt concerned it is possibly hormonal and discussed labs and options including slynd continuously to avoid the hormone fluctuations     Problem PHYS EXAM:  (pertinent findings noted in PHYS EXAM above)     Problem ASSESMENT and PLAN:    Anxiety  -     Follicle Stimulating Hormone; Future  -     Luteinizing Hormone; Future  -     Testosterone; Future  -     TSH; Future  -     T4, Free; Future    Rash/skin eruption  -     DARRICK by IFA, w/Rflx; Future  -     Rheumatoid factor panel; Future; Expected date: 02/29/2024  -     Sedimentation rate; Future; Expected date: 02/29/2024    Signed rr and will fax to sotomayor to check labs he has done    *Total time spent: 40 min. 50 % or more was spent on counseling about diagnosis, treatment options, and coordination of care.

## 2024-02-29 ENCOUNTER — OFFICE VISIT (OUTPATIENT)
Dept: OBSTETRICS AND GYNECOLOGY | Facility: CLINIC | Age: 49
End: 2024-02-29
Payer: COMMERCIAL

## 2024-02-29 VITALS — SYSTOLIC BLOOD PRESSURE: 90 MMHG | DIASTOLIC BLOOD PRESSURE: 66 MMHG | BODY MASS INDEX: 31.95 KG/M2 | WEIGHT: 158.19 LBS

## 2024-02-29 DIAGNOSIS — F41.9 ANXIETY: ICD-10-CM

## 2024-02-29 DIAGNOSIS — R00.2 PALPITATIONS: ICD-10-CM

## 2024-02-29 DIAGNOSIS — Z01.419 ENCOUNTER FOR WELL WOMAN EXAM WITH ROUTINE GYNECOLOGICAL EXAM: ICD-10-CM

## 2024-02-29 DIAGNOSIS — Z12.31 BREAST CANCER SCREENING BY MAMMOGRAM: Primary | ICD-10-CM

## 2024-02-29 DIAGNOSIS — R21 RASH/SKIN ERUPTION: ICD-10-CM

## 2024-02-29 PROCEDURE — 99459 PELVIC EXAMINATION: CPT | Mod: S$GLB,,, | Performed by: OBSTETRICS & GYNECOLOGY

## 2024-02-29 PROCEDURE — 3074F SYST BP LT 130 MM HG: CPT | Mod: CPTII,S$GLB,, | Performed by: OBSTETRICS & GYNECOLOGY

## 2024-02-29 PROCEDURE — 99396 PREV VISIT EST AGE 40-64: CPT | Mod: S$GLB,,, | Performed by: OBSTETRICS & GYNECOLOGY

## 2024-02-29 PROCEDURE — 3078F DIAST BP <80 MM HG: CPT | Mod: CPTII,S$GLB,, | Performed by: OBSTETRICS & GYNECOLOGY

## 2024-02-29 PROCEDURE — 1159F MED LIST DOCD IN RCRD: CPT | Mod: CPTII,S$GLB,, | Performed by: OBSTETRICS & GYNECOLOGY

## 2024-02-29 PROCEDURE — 3008F BODY MASS INDEX DOCD: CPT | Mod: CPTII,S$GLB,, | Performed by: OBSTETRICS & GYNECOLOGY

## 2024-02-29 PROCEDURE — 99213 OFFICE O/P EST LOW 20 MIN: CPT | Mod: 25,S$GLB,, | Performed by: OBSTETRICS & GYNECOLOGY

## 2024-02-29 RX ORDER — SIMVASTATIN 10 MG/1
TABLET, FILM COATED ORAL
COMMUNITY
Start: 2024-02-09

## 2024-03-06 ENCOUNTER — PATIENT MESSAGE (OUTPATIENT)
Dept: OBSTETRICS AND GYNECOLOGY | Facility: CLINIC | Age: 49
End: 2024-03-06
Payer: COMMERCIAL

## 2024-03-06 LAB — Lab: NORMAL

## 2024-04-23 DIAGNOSIS — Z30.9 ENCOUNTER FOR CONTRACEPTIVE MANAGEMENT, UNSPECIFIED TYPE: Primary | ICD-10-CM

## 2024-04-23 RX ORDER — DROSPIRENONE 4 MG/1
4 TABLET, FILM COATED ORAL
COMMUNITY
End: 2024-04-23 | Stop reason: SDUPTHER

## 2024-04-23 RX ORDER — DROSPIRENONE 4 MG/1
4 TABLET, FILM COATED ORAL DAILY
Qty: 84 TABLET | Refills: 4 | Status: SHIPPED | OUTPATIENT
Start: 2024-04-23

## 2024-04-23 NOTE — TELEPHONE ENCOUNTER
Patient is calling in regards to meds. Reports the sample birth control went well, and is wanting a full size sent in.

## 2024-04-23 NOTE — TELEPHONE ENCOUNTER
----- Message from Robina Washington sent at 4/23/2024  8:46 AM CDT -----  Contact: Lorena  Patient is calling in regards to meds. Reports the sample birth control went well, and is wanting a full size sent in. Please return call to pt at  [phone] (work)         [id number] - [place] - [address]  [address] [address]  Phone: [phone] Fax: [phone]

## 2024-04-23 NOTE — TELEPHONE ENCOUNTER
Returned call to patient. Informed patient that Dr. Levi would send out her prescription of Slynd.

## 2024-05-01 ENCOUNTER — TELEPHONE (OUTPATIENT)
Dept: OBSTETRICS AND GYNECOLOGY | Facility: CLINIC | Age: 49
End: 2024-05-01
Payer: COMMERCIAL

## 2024-05-01 NOTE — TELEPHONE ENCOUNTER
----- Message from Jonna Jones sent at 5/1/2024 11:42 AM CDT -----  Contact: self  Type: Staff Message    Caller: Lorena WALLACE  Call Back Number: [phone]  Nature of the Call: pt wanting labs to be sent to [address]   Additional Information: na

## 2024-05-08 LAB
ANA SER-ACNC: NEGATIVE
FSH: 7.55 MIU/ML
LH: 8.52 MIU/ML
RHEUMATOID ARTHRITIS FACTOR: NEGATIVE IU/ML
RHEUMATOID ARTHRITIS, QN/FLUID: <10 IU/ML
SED RATE (WESTERGREN): 12 MM/HR (ref 0–20)
T4, FREE: 1.48 NG/DL (ref 0.93–1.7)
TESTOST SERPL-MCNC: 3.71 NG/DL (ref 8.4–48.1)
TSH SERPL DL<=0.005 MIU/L-ACNC: 0.35 UIU/ML (ref 0.27–4.2)

## 2024-05-08 NOTE — PROGRESS NOTES
Please let pt know that all of her labwork is normal. If she sees the testosterone level being a little low, that is ok as she is on ocps and does normally lower the testosterone so that is not abnormal

## 2024-05-09 ENCOUNTER — PATIENT MESSAGE (OUTPATIENT)
Dept: OBSTETRICS AND GYNECOLOGY | Facility: CLINIC | Age: 49
End: 2024-05-09
Payer: COMMERCIAL

## 2024-07-30 ENCOUNTER — DOCUMENTATION ONLY (OUTPATIENT)
Dept: OBSTETRICS AND GYNECOLOGY | Facility: CLINIC | Age: 49
End: 2024-07-30
Payer: COMMERCIAL

## 2024-08-30 DIAGNOSIS — K21.9 GASTROESOPHAGEAL REFLUX DISEASE, UNSPECIFIED WHETHER ESOPHAGITIS PRESENT: ICD-10-CM

## 2024-09-05 RX ORDER — PANTOPRAZOLE SODIUM 40 MG/1
40 TABLET, DELAYED RELEASE ORAL
Qty: 90 TABLET | Refills: 3 | Status: SHIPPED | OUTPATIENT
Start: 2024-09-05

## 2025-02-03 DIAGNOSIS — Z30.9 ENCOUNTER FOR CONTRACEPTIVE MANAGEMENT, UNSPECIFIED TYPE: ICD-10-CM

## 2025-02-03 RX ORDER — NORETHINDRONE 0.35 MG/1
1 TABLET ORAL DAILY
Qty: 90 TABLET | Refills: 2 | Status: SHIPPED | OUTPATIENT
Start: 2025-02-03

## 2025-02-03 NOTE — TELEPHONE ENCOUNTER
Please see the attached refill request. Patient has her annual in March. We sent out Slynd att her last visit. They are wanting to change this.

## 2025-03-04 RX ORDER — PERFLUOROHEXYLOCTANE 1 MG/MG
SOLUTION OPHTHALMIC
COMMUNITY
Start: 2025-01-02

## 2025-03-04 RX ORDER — SEMAGLUTIDE 0.68 MG/ML
0.25 INJECTION, SOLUTION SUBCUTANEOUS WEEKLY
COMMUNITY
Start: 2025-02-21

## 2025-03-04 RX ORDER — MONTELUKAST SODIUM 10 MG/1
10 TABLET ORAL
COMMUNITY
Start: 2025-01-31

## 2025-03-04 RX ORDER — CYCLOBENZAPRINE HCL 10 MG
10 TABLET ORAL 3 TIMES DAILY PRN
COMMUNITY
Start: 2025-01-27

## 2025-03-04 RX ORDER — DROSPIRENONE 4 MG/1
1 TABLET, FILM COATED ORAL DAILY
COMMUNITY
Start: 2025-01-05 | End: 2025-03-06 | Stop reason: SDUPTHER

## 2025-03-04 NOTE — PROGRESS NOTES
CC: WELL WOMAN (age 45-59)-perimenopausal  Patient Care Team:  Jimbo Phillips MD as PCP - General (Internal Medicine)  Oswaldo Asencio MD (Gastroenterology)        HPI:  Patient is a 49 y.o. who presents for her well woman exam today.  History reviewed with patient.   Patient is without complaints or concerns today. Has some irregular btb some months randomly but nothing heavy    CONTRACEPTION: prog only ocps  HX ABNL PAPS: hx [id number]    REVIEW OF PRIOR DATA/ HEALTH MAINTENANCE:  LAST ANNUAL:   2024   LAST MMG- 2024-  [facility]    LAST COLONOSCOPY- - by Dr. Asencio - q 10 yrs (neg)        Past Medical History:   Diagnosis Date    Endometriosis     Fatty liver     Hiatal hernia with GERD     Hypothyroidism     Irritable bowel syndrome with constipation     Obesity, Class I, BMI 30.0-34.9 (see actual BMI)     PCOS (polycystic ovarian syndrome)     Precancerous skin lesion     nose    Sleep apnea     no CPAP since recall     SURGICAL HX:   has a past surgical history that includes GASTRIC SLEEVE (); Fulguration of endometriosis; and Colonoscopy (2020).    SOCIAL HX:    reports that she quit smoking about 13 years ago. Her smoking use included cigarettes. She started smoking about 33 years ago. She has a 20 pack-year smoking history. She has been exposed to tobacco smoke. She has never used smokeless tobacco. She reports that she does not currently use alcohol. She reports that she does not use drugs.    Current Outpatient Medications   Medication Instructions    amitriptyline (ELAVIL) 10 MG tablet TAKE 1 TABLET BY MOUTH 1 HOUR BEFORE BEDTIME    buPROPion (WELLBUTRIN XL) 300 mg, Daily    TONI 0.35 mg, Oral, Daily    cyclobenzaprine (FLEXERIL) 10 mg, 3 times daily PRN    fluticasone propionate (FLONASE) 50 mcg/actuation nasal spray 2 sprays, 2 times daily    levothyroxine (SYNTHROID) 88 mcg, Daily    MIEBO, PF, 100 % Drop     montelukast (SINGULAIR) 10 mg    norethindrone  (TONI) 0.35 mg, Oral, Daily    OZEMPIC 0.25 mg, Weekly    pantoprazole (PROTONIX) 40 mg, Oral    simvastatin (ZOCOR) 10 MG tablet TAKE 1 TABLET BY MOUTH EVERY NIGHT TO CONTROL CHOLESTEROL     VITALS:  Blood pressure 118/82, weight 70.3 kg (155 lb), last menstrual period 02/20/2025.  Body mass index is 31.31 kg/m².     PHYSICAL EXAM-  APPEARANCE: Well appearing, in no acute distress.  CV/ PULM: no resp distress, normal resp effort  PSYCH: Normal mood and affect, cooperative  SKIN: No rashes, lesions, or abnormal bruising  ABD: Soft, no masses, tenderness, or distension  BREASTS: No skin changes,nipple dc. No palpable masses or tenderness  PELVIC:  VULVA: Normal female genitalia. No lesions  BLADDER: No suprapubic tenderness  VAGINA/ CVX:  No lesions, no d/c  UTERUS: mobile, non-tender  ADNEXA: No masses, tenderness, or fullness.  ANUS/ PERINEUM: Normal tone.  No lesions.           *patient verbally consented for exam and female chaperone present for entire exam    ASSESSMENT and PLAN:  Encounter for well woman exam with routine gynecological exam  -     Liquid-based pap smear, screening    Breast cancer screening by mammogram  -     Mammo Digital Screening Bilat w/ Bentley (XPD); Future; Expected date: 03/18/2025    Encounter for contraceptive management, unspecified type  -     norethindrone (TONI) 0.35 mg tablet; Take 1 tablet (0.35 mg total) by mouth once daily.  Dispense: 84 tablet; Refill: 4      FOLLOWUP:  1 year for wwe or sooner prn    COUNSELING:  Patient was counseled today on recommendation for yearly pelvic exam, current Pap guidelines, self breast exams, contraception, recommendations for annual screening mammograms, and routine screening colonoscopy at age 44.  Encouraged patient to see her PCP for other health maintenance.

## 2025-03-06 ENCOUNTER — OFFICE VISIT (OUTPATIENT)
Dept: OBSTETRICS AND GYNECOLOGY | Facility: CLINIC | Age: 50
End: 2025-03-06
Payer: COMMERCIAL

## 2025-03-06 VITALS — SYSTOLIC BLOOD PRESSURE: 118 MMHG | WEIGHT: 155 LBS | BODY MASS INDEX: 31.31 KG/M2 | DIASTOLIC BLOOD PRESSURE: 82 MMHG

## 2025-03-06 DIAGNOSIS — Z12.31 BREAST CANCER SCREENING BY MAMMOGRAM: ICD-10-CM

## 2025-03-06 DIAGNOSIS — Z01.419 ENCOUNTER FOR WELL WOMAN EXAM WITH ROUTINE GYNECOLOGICAL EXAM: Primary | ICD-10-CM

## 2025-03-06 DIAGNOSIS — Z30.9 ENCOUNTER FOR CONTRACEPTIVE MANAGEMENT, UNSPECIFIED TYPE: ICD-10-CM

## 2025-03-06 RX ORDER — NORETHINDRONE 0.35 MG/1
1 TABLET ORAL DAILY
Qty: 84 TABLET | Refills: 4 | Status: SHIPPED | OUTPATIENT
Start: 2025-03-06 | End: 2026-03-06

## 2025-03-10 ENCOUNTER — RESULTS FOLLOW-UP (OUTPATIENT)
Dept: OBSTETRICS AND GYNECOLOGY | Facility: CLINIC | Age: 50
End: 2025-03-10

## 2025-08-06 ENCOUNTER — TELEPHONE (OUTPATIENT)
Dept: OBSTETRICS AND GYNECOLOGY | Facility: CLINIC | Age: 50
End: 2025-08-06
Payer: COMMERCIAL

## 2025-08-15 ENCOUNTER — TELEPHONE (OUTPATIENT)
Dept: GASTROENTEROLOGY | Facility: CLINIC | Age: 50
End: 2025-08-15
Payer: COMMERCIAL

## 2025-08-17 DIAGNOSIS — K21.9 GASTROESOPHAGEAL REFLUX DISEASE, UNSPECIFIED WHETHER ESOPHAGITIS PRESENT: ICD-10-CM

## 2025-08-18 RX ORDER — PANTOPRAZOLE SODIUM 40 MG/1
40 TABLET, DELAYED RELEASE ORAL
Qty: 30 TABLET | Refills: 11 | Status: SHIPPED | OUTPATIENT
Start: 2025-08-18

## 2025-09-03 ENCOUNTER — OFFICE VISIT (OUTPATIENT)
Dept: GASTROENTEROLOGY | Facility: CLINIC | Age: 50
End: 2025-09-03
Payer: COMMERCIAL

## 2025-09-03 VITALS
SYSTOLIC BLOOD PRESSURE: 110 MMHG | BODY MASS INDEX: 31.65 KG/M2 | WEIGHT: 157 LBS | HEART RATE: 99 BPM | OXYGEN SATURATION: 99 % | DIASTOLIC BLOOD PRESSURE: 76 MMHG | HEIGHT: 59 IN

## 2025-09-03 DIAGNOSIS — K62.5 BRBPR (BRIGHT RED BLOOD PER RECTUM): ICD-10-CM

## 2025-09-03 DIAGNOSIS — Z12.11 SCREENING FOR COLORECTAL CANCER: ICD-10-CM

## 2025-09-03 DIAGNOSIS — Z12.12 SCREENING FOR COLORECTAL CANCER: ICD-10-CM

## 2025-09-03 DIAGNOSIS — K21.9 GASTROESOPHAGEAL REFLUX DISEASE, UNSPECIFIED WHETHER ESOPHAGITIS PRESENT: Primary | ICD-10-CM

## 2025-09-03 DIAGNOSIS — K58.9 IRRITABLE BOWEL SYNDROME, UNSPECIFIED TYPE: ICD-10-CM

## 2025-09-03 DIAGNOSIS — Z87.11 HISTORY OF PEPTIC ULCER DISEASE: ICD-10-CM

## 2025-09-03 PROCEDURE — 1159F MED LIST DOCD IN RCRD: CPT | Mod: CPTII,S$GLB,, | Performed by: INTERNAL MEDICINE

## 2025-09-03 PROCEDURE — 3008F BODY MASS INDEX DOCD: CPT | Mod: CPTII,S$GLB,, | Performed by: INTERNAL MEDICINE

## 2025-09-03 PROCEDURE — 1160F RVW MEDS BY RX/DR IN RCRD: CPT | Mod: CPTII,S$GLB,, | Performed by: INTERNAL MEDICINE

## 2025-09-03 PROCEDURE — 99214 OFFICE O/P EST MOD 30 MIN: CPT | Mod: S$GLB,,, | Performed by: INTERNAL MEDICINE

## 2025-09-03 PROCEDURE — 3078F DIAST BP <80 MM HG: CPT | Mod: CPTII,S$GLB,, | Performed by: INTERNAL MEDICINE

## 2025-09-03 PROCEDURE — 3074F SYST BP LT 130 MM HG: CPT | Mod: CPTII,S$GLB,, | Performed by: INTERNAL MEDICINE

## 2025-09-03 RX ORDER — SOD SULF/POT CHLORIDE/MAG SULF 1.479 G
TABLET ORAL
Qty: 24 TABLET | Refills: 0 | Status: SHIPPED | OUTPATIENT
Start: 2025-09-03